# Patient Record
Sex: MALE | Race: OTHER | NOT HISPANIC OR LATINO | URBAN - METROPOLITAN AREA
[De-identification: names, ages, dates, MRNs, and addresses within clinical notes are randomized per-mention and may not be internally consistent; named-entity substitution may affect disease eponyms.]

---

## 2024-02-02 ENCOUNTER — EMERGENCY (EMERGENCY)
Facility: HOSPITAL | Age: 44
LOS: 1 days | Discharge: HOME | End: 2024-02-02
Attending: EMERGENCY MEDICINE | Admitting: STUDENT IN AN ORGANIZED HEALTH CARE EDUCATION/TRAINING PROGRAM
Payer: COMMERCIAL

## 2024-02-02 VITALS
SYSTOLIC BLOOD PRESSURE: 116 MMHG | RESPIRATION RATE: 20 BRPM | OXYGEN SATURATION: 100 % | HEART RATE: 63 BPM | DIASTOLIC BLOOD PRESSURE: 70 MMHG | TEMPERATURE: 98 F

## 2024-02-02 VITALS
HEART RATE: 73 BPM | RESPIRATION RATE: 16 BRPM | SYSTOLIC BLOOD PRESSURE: 120 MMHG | OXYGEN SATURATION: 100 % | DIASTOLIC BLOOD PRESSURE: 66 MMHG | TEMPERATURE: 99 F

## 2024-02-02 LAB
ALBUMIN SERPL ELPH-MCNC: 4.6 G/DL — SIGNIFICANT CHANGE UP (ref 3.3–5)
ALP SERPL-CCNC: 55 U/L — SIGNIFICANT CHANGE UP (ref 40–120)
ALT FLD-CCNC: 18 U/L — SIGNIFICANT CHANGE UP (ref 4–41)
ANION GAP SERPL CALC-SCNC: 13 MMOL/L — SIGNIFICANT CHANGE UP (ref 7–14)
AST SERPL-CCNC: 35 U/L — SIGNIFICANT CHANGE UP (ref 4–40)
BASOPHILS # BLD AUTO: 0.01 K/UL — SIGNIFICANT CHANGE UP (ref 0–0.2)
BASOPHILS NFR BLD AUTO: 0.1 % — SIGNIFICANT CHANGE UP (ref 0–2)
BILIRUB SERPL-MCNC: 0.7 MG/DL — SIGNIFICANT CHANGE UP (ref 0.2–1.2)
BUN SERPL-MCNC: 14 MG/DL — SIGNIFICANT CHANGE UP (ref 7–23)
CALCIUM SERPL-MCNC: 9.6 MG/DL — SIGNIFICANT CHANGE UP (ref 8.4–10.5)
CHLORIDE SERPL-SCNC: 101 MMOL/L — SIGNIFICANT CHANGE UP (ref 98–107)
CO2 SERPL-SCNC: 26 MMOL/L — SIGNIFICANT CHANGE UP (ref 22–31)
CREAT SERPL-MCNC: 0.86 MG/DL — SIGNIFICANT CHANGE UP (ref 0.5–1.3)
EGFR: 112 ML/MIN/1.73M2 — SIGNIFICANT CHANGE UP
EOSINOPHIL # BLD AUTO: 0 K/UL — SIGNIFICANT CHANGE UP (ref 0–0.5)
EOSINOPHIL NFR BLD AUTO: 0 % — SIGNIFICANT CHANGE UP (ref 0–6)
GLUCOSE SERPL-MCNC: 113 MG/DL — HIGH (ref 70–99)
HCT VFR BLD CALC: 44.2 % — SIGNIFICANT CHANGE UP (ref 39–50)
HGB BLD-MCNC: 15.1 G/DL — SIGNIFICANT CHANGE UP (ref 13–17)
IANC: 6.67 K/UL — SIGNIFICANT CHANGE UP (ref 1.8–7.4)
IMM GRANULOCYTES NFR BLD AUTO: 0.7 % — SIGNIFICANT CHANGE UP (ref 0–0.9)
LYMPHOCYTES # BLD AUTO: 0.59 K/UL — LOW (ref 1–3.3)
LYMPHOCYTES # BLD AUTO: 7.8 % — LOW (ref 13–44)
MCHC RBC-ENTMCNC: 29.8 PG — SIGNIFICANT CHANGE UP (ref 27–34)
MCHC RBC-ENTMCNC: 34.2 GM/DL — SIGNIFICANT CHANGE UP (ref 32–36)
MCV RBC AUTO: 87.4 FL — SIGNIFICANT CHANGE UP (ref 80–100)
MONOCYTES # BLD AUTO: 0.25 K/UL — SIGNIFICANT CHANGE UP (ref 0–0.9)
MONOCYTES NFR BLD AUTO: 3.3 % — SIGNIFICANT CHANGE UP (ref 2–14)
NEUTROPHILS # BLD AUTO: 6.67 K/UL — SIGNIFICANT CHANGE UP (ref 1.8–7.4)
NEUTROPHILS NFR BLD AUTO: 88.1 % — HIGH (ref 43–77)
NRBC # BLD: 0 /100 WBCS — SIGNIFICANT CHANGE UP (ref 0–0)
NRBC # FLD: 0 K/UL — SIGNIFICANT CHANGE UP (ref 0–0)
PLATELET # BLD AUTO: 315 K/UL — SIGNIFICANT CHANGE UP (ref 150–400)
POTASSIUM SERPL-MCNC: 5 MMOL/L — SIGNIFICANT CHANGE UP (ref 3.5–5.3)
POTASSIUM SERPL-SCNC: 5 MMOL/L — SIGNIFICANT CHANGE UP (ref 3.5–5.3)
PROT SERPL-MCNC: 8.2 G/DL — SIGNIFICANT CHANGE UP (ref 6–8.3)
RBC # BLD: 5.06 M/UL — SIGNIFICANT CHANGE UP (ref 4.2–5.8)
RBC # FLD: 12.5 % — SIGNIFICANT CHANGE UP (ref 10.3–14.5)
SODIUM SERPL-SCNC: 140 MMOL/L — SIGNIFICANT CHANGE UP (ref 135–145)
WBC # BLD: 7.57 K/UL — SIGNIFICANT CHANGE UP (ref 3.8–10.5)
WBC # FLD AUTO: 7.57 K/UL — SIGNIFICANT CHANGE UP (ref 3.8–10.5)

## 2024-02-02 PROCEDURE — 99291 CRITICAL CARE FIRST HOUR: CPT

## 2024-02-02 RX ORDER — EPINEPHRINE 0.3 MG/.3ML
0.3 INJECTION INTRAMUSCULAR; SUBCUTANEOUS
Qty: 2 | Refills: 0
Start: 2024-02-02

## 2024-02-02 RX ORDER — DIPHENHYDRAMINE HCL 50 MG
25 CAPSULE ORAL ONCE
Refills: 0 | Status: COMPLETED | OUTPATIENT
Start: 2024-02-02 | End: 2024-02-02

## 2024-02-02 RX ORDER — EPINEPHRINE 0.3 MG/.3ML
0.3 INJECTION INTRAMUSCULAR; SUBCUTANEOUS ONCE
Refills: 0 | Status: COMPLETED | OUTPATIENT
Start: 2024-02-02 | End: 2024-02-02

## 2024-02-02 RX ORDER — FAMOTIDINE 10 MG/ML
20 INJECTION INTRAVENOUS ONCE
Refills: 0 | Status: COMPLETED | OUTPATIENT
Start: 2024-02-02 | End: 2024-02-02

## 2024-02-02 RX ORDER — DIPHENHYDRAMINE HCL 50 MG
50 CAPSULE ORAL ONCE
Refills: 0 | Status: COMPLETED | OUTPATIENT
Start: 2024-02-02 | End: 2024-02-02

## 2024-02-02 RX ADMIN — Medication 25 MILLIGRAM(S): at 01:10

## 2024-02-02 RX ADMIN — EPINEPHRINE 0.3 MILLIGRAM(S): 0.3 INJECTION INTRAMUSCULAR; SUBCUTANEOUS at 01:17

## 2024-02-02 RX ADMIN — FAMOTIDINE 20 MILLIGRAM(S): 10 INJECTION INTRAVENOUS at 06:35

## 2024-02-02 RX ADMIN — Medication 50 MILLIGRAM(S): at 06:35

## 2024-02-02 RX ADMIN — Medication 125 MILLIGRAM(S): at 06:35

## 2024-02-02 NOTE — ED PROVIDER NOTE - PATIENT PORTAL LINK FT
You can access the FollowMyHealth Patient Portal offered by [facility] by registering at the following website: [email or web address]. By joining [email or web address]’s FollowMyHealth portal, you will also be able to view your health information using other applications (apps) compatible with our system.

## 2024-02-02 NOTE — ED ADULT NURSE NOTE - CHIEF COMPLAINT QUOTE
Pt says he felt tongue swelling and throat closing up since 8:30pm. Pt had hives earlier  and then when his tongue swelled up he went to urgent care and got  Benadryl 25mg  Hydroxyzine 25mg, Prednisone 40mg around 10pm. Pt says tongue swelling is slightly better but has difficulty swallowing. tongue appears swollen. Has no past medical history and is not on any medications regularly. Denies consumption of seafood/ use of any new products.

## 2024-02-02 NOTE — ED PROVIDER NOTE - PROGRESS NOTE DETAILS
Patient states swelling is the same after Epi, benadryl.     Adrianna Donaldson MD, PGY3 Patient has no more lingual edema, patient does not want to stay for further observation.  EpiPen sent to his pharmacy.  Follow up with allergy    (Heron Reynolds MD; attending emergency medicine and medical toxicology)

## 2024-02-02 NOTE — ED ADULT NURSE NOTE - NSFALLUNIVINTERV_ED_ALL_ED
Bed/Stretcher in lowest position, wheels locked, appropriate side rails in place/Call bell, personal items and telephone in reach/Instruct patient to call for assistance before getting out of bed/chair/stretcher/Non-slip footwear applied when patient is off stretcher/[place] to call system/Physically safe environment - no spills, clutter or unnecessary equipment/Purposeful proactive rounding/Room/bathroom lighting operational, light cord in reach

## 2024-02-02 NOTE — ED PROVIDER NOTE - ATTENDING CONTRIBUTION TO CARE
43 year old with urticaria and tongue swelling. concern for anaphylaxis. will give epi. patgient rec'd steroids at . benadryl. frequent reassessment. monitor for improvement. observation for at least 6 hours

## 2024-02-02 NOTE — ED ADULT NURSE NOTE - OBJECTIVE STATEMENT
pt received to rm 4  , a&ox4 , ambulatory , denies phx  , p/w tounge swelling and hives beginning this morning. pt went to urgent care earlier today and received benadryl and steroids with some relief. pt was told if swelling not all the was down to come to the ER . Pt breathing even and unlabored on room air.  Denies fever, chills, cough, SOB, chest pain, palpitations, dizziness, nausea, vomiting, diarrhea, constipation, numbness, tingling.  pt educated on fall precautions and confirms understanding via teach back method. Stretcher locked in lowest position with siderails up x2. Call bell and personal items within reach.

## 2024-02-02 NOTE — ED PROVIDER NOTE - OBJECTIVE STATEMENT
42 yo M no pmhx presents with tongue swelling that started at 8.30p. Patient states he woke up from sleep with enlarged tongue, difficulty swallowing and hives. patient has had hives on and off the last two days, hives became acutely worse this evening. No known allergies. He has been using some new soap. Went to urgent care where he received IM steroids, benadryl, po steroid rx and loratidine rx. Patient took his PO meds at 10PM.

## 2024-02-02 NOTE — ED PROVIDER NOTE - PHYSICAL EXAMINATION
GENERAL: no acute distress, non-toxic appearing  HEAD: normocephalic, atraumatic  HEENT: +marked tongue enlargement, uvular edema, no pooled secretions, normal conjunctiva, oral mucosa moist, neck supple  CARDIAC: regular rate and rhythm, normal S1 and S2,  no appreciable murmurs  PULM: clear to ascultation bilaterally, no crackles, rales, rhonchi, or wheezing  GI: abdomen nondistended, soft, nontender, no guarding or rebound tenderness  NEURO: alert and oriented x 3, normal speech, moving all extremities   MSK: no visible deformities, no peripheral edema, calf tenderness/redness/swelling  SKIN: +scant urticaria L forearm, no visible rashes, dry, well-perfused  PSYCH: appropriate mood and affect

## 2024-02-02 NOTE — ED ADULT NURSE REASSESSMENT NOTE - NS ED NURSE REASSESS COMMENT FT1
Pt is A&O x 4, ambulatory w/o assistance, shows no signs of acute distress. Tongue continues to be swollen. Respirations even and unlabored. Speaking in full sentences. No drooling or stridor noted. SpO2 100% on room air. VSS. Pending reassessment. Will continue to monitor.

## 2024-02-02 NOTE — ED ADULT TRIAGE NOTE - CHIEF COMPLAINT QUOTE
Pt says he felt tongue swelling and throat closing up since 8:30pm. Pt had hives earlier  and then when his tongue swelled up he went to urgent care and got  Benadryl 25mg , Hydroxyzine 25mg, Prednisone 40mg and Benadryl 25mg around 10pm. Pt says tongue swelling is slightly better but has difficulty swallowing. tongue appears swollen. Pt says he felt tongue swelling and throat closing up since 8:30pm. Pt had hives earlier  and then when his tongue swelled up he went to urgent care and got  Benadryl 25mg , Hydroxyzine 25mg, Prednisone 40mg and Benadryl 25mg around 10pm. Pt says tongue swelling is slightly better but has difficulty swallowing. tongue appears swollen. Has no past medical history and is not on any medications regularly. Denies consumption of seafood/ use of any new products. Pt says he felt tongue swelling and throat closing up since 8:30pm. Pt had hives earlier  and then when his tongue swelled up he went to urgent care and got  Benadryl 25mg  Hydroxyzine 25mg, Prednisone 40mg around 10pm. Pt says tongue swelling is slightly better but has difficulty swallowing. tongue appears swollen. Has no past medical history and is not on any medications regularly. Denies consumption of seafood/ use of any new products.

## 2024-02-02 NOTE — ED ADULT NURSE REASSESSMENT NOTE - NS ED NURSE REASSESS COMMENT FT1
Pt report received, pt in no acute distress denies any discomfort at this time pending disposition airam kang

## 2024-02-02 NOTE — ED PROVIDER NOTE - CLINICAL SUMMARY MEDICAL DECISION MAKING FREE TEXT BOX
42 yo M no pmhx with angioedema and urticaria - 2/2 to presumed allergic rxn. Currently he is not in respiratory distress, has normal vitals however still complaining of throat swelling. Will give IM epi, complete dose of benadryl (only took 25 mg). and monitor.